# Patient Record
Sex: FEMALE | Race: WHITE | NOT HISPANIC OR LATINO | Employment: OTHER | URBAN - METROPOLITAN AREA
[De-identification: names, ages, dates, MRNs, and addresses within clinical notes are randomized per-mention and may not be internally consistent; named-entity substitution may affect disease eponyms.]

---

## 2017-11-14 ENCOUNTER — RECORDS - HEALTHEAST (OUTPATIENT)
Dept: LAB | Facility: CLINIC | Age: 67
End: 2017-11-14

## 2017-11-14 LAB
CHOLEST SERPL-MCNC: 150 MG/DL
FASTING STATUS PATIENT QL REPORTED: ABNORMAL
HDLC SERPL-MCNC: 40 MG/DL
LDLC SERPL CALC-MCNC: 75 MG/DL
TRIGL SERPL-MCNC: 176 MG/DL

## 2018-08-24 ENCOUNTER — RECORDS - HEALTHEAST (OUTPATIENT)
Dept: LAB | Facility: CLINIC | Age: 68
End: 2018-08-24

## 2018-08-24 LAB
ALBUMIN SERPL-MCNC: 3.8 G/DL (ref 3.5–5)
ALP SERPL-CCNC: 81 U/L (ref 45–120)
ALT SERPL W P-5'-P-CCNC: 26 U/L (ref 0–45)
ANION GAP SERPL CALCULATED.3IONS-SCNC: 10 MMOL/L (ref 5–18)
AST SERPL W P-5'-P-CCNC: 18 U/L (ref 0–40)
BILIRUB SERPL-MCNC: 0.6 MG/DL (ref 0–1)
BUN SERPL-MCNC: 10 MG/DL (ref 8–22)
CALCIUM SERPL-MCNC: 9.5 MG/DL (ref 8.5–10.5)
CHLORIDE BLD-SCNC: 104 MMOL/L (ref 98–107)
CHOLEST SERPL-MCNC: 149 MG/DL
CO2 SERPL-SCNC: 25 MMOL/L (ref 22–31)
CREAT SERPL-MCNC: 0.8 MG/DL (ref 0.6–1.1)
FASTING STATUS PATIENT QL REPORTED: ABNORMAL
GFR SERPL CREATININE-BSD FRML MDRD: >60 ML/MIN/1.73M2
GLUCOSE BLD-MCNC: 205 MG/DL (ref 70–125)
HDLC SERPL-MCNC: 39 MG/DL
LDLC SERPL CALC-MCNC: 85 MG/DL
POTASSIUM BLD-SCNC: 3.9 MMOL/L (ref 3.5–5)
PROT SERPL-MCNC: 6.3 G/DL (ref 6–8)
SODIUM SERPL-SCNC: 139 MMOL/L (ref 136–145)
TRIGL SERPL-MCNC: 123 MG/DL
TSH SERPL DL<=0.005 MIU/L-ACNC: 1.88 UIU/ML (ref 0.3–5)

## 2018-08-25 LAB — BACTERIA SPEC CULT: NORMAL

## 2019-05-10 ENCOUNTER — RECORDS - HEALTHEAST (OUTPATIENT)
Dept: LAB | Facility: CLINIC | Age: 69
End: 2019-05-10

## 2019-05-10 LAB
ANION GAP SERPL CALCULATED.3IONS-SCNC: 10 MMOL/L (ref 5–18)
BUN SERPL-MCNC: 14 MG/DL (ref 8–22)
CALCIUM SERPL-MCNC: 9.8 MG/DL (ref 8.5–10.5)
CHLORIDE BLD-SCNC: 106 MMOL/L (ref 98–107)
CHOLEST SERPL-MCNC: 138 MG/DL
CO2 SERPL-SCNC: 23 MMOL/L (ref 22–31)
CREAT SERPL-MCNC: 0.79 MG/DL (ref 0.6–1.1)
FASTING STATUS PATIENT QL REPORTED: ABNORMAL
GFR SERPL CREATININE-BSD FRML MDRD: >60 ML/MIN/1.73M2
GLUCOSE BLD-MCNC: 135 MG/DL (ref 70–125)
HDLC SERPL-MCNC: 37 MG/DL
LDLC SERPL CALC-MCNC: 56 MG/DL
POTASSIUM BLD-SCNC: 3.7 MMOL/L (ref 3.5–5)
SODIUM SERPL-SCNC: 139 MMOL/L (ref 136–145)
TRIGL SERPL-MCNC: 226 MG/DL

## 2019-12-06 ENCOUNTER — RECORDS - HEALTHEAST (OUTPATIENT)
Dept: LAB | Facility: CLINIC | Age: 69
End: 2019-12-06

## 2019-12-06 LAB
ALBUMIN SERPL-MCNC: 3.8 G/DL (ref 3.5–5)
ALP SERPL-CCNC: 73 U/L (ref 45–120)
ALT SERPL W P-5'-P-CCNC: 38 U/L (ref 0–45)
AMYLASE SERPL-CCNC: 56 U/L (ref 5–120)
AST SERPL W P-5'-P-CCNC: 23 U/L (ref 0–40)
BILIRUB DIRECT SERPL-MCNC: 0.3 MG/DL
BILIRUB SERPL-MCNC: 0.7 MG/DL (ref 0–1)
LIPASE SERPL-CCNC: 49 U/L (ref 0–52)
PROT SERPL-MCNC: 6.6 G/DL (ref 6–8)

## 2020-01-21 ENCOUNTER — RECORDS - HEALTHEAST (OUTPATIENT)
Dept: LAB | Facility: CLINIC | Age: 70
End: 2020-01-21

## 2020-01-23 LAB — H PYLORI AG STL QL IA: NEGATIVE

## 2020-06-19 ENCOUNTER — RECORDS - HEALTHEAST (OUTPATIENT)
Dept: LAB | Facility: CLINIC | Age: 70
End: 2020-06-19

## 2020-06-19 LAB
ALBUMIN SERPL-MCNC: 3.7 G/DL (ref 3.5–5)
ALP SERPL-CCNC: 120 U/L (ref 45–120)
ALT SERPL W P-5'-P-CCNC: 45 U/L (ref 0–45)
ANION GAP SERPL CALCULATED.3IONS-SCNC: 10 MMOL/L (ref 5–18)
AST SERPL W P-5'-P-CCNC: 26 U/L (ref 0–40)
BILIRUB SERPL-MCNC: 0.4 MG/DL (ref 0–1)
BUN SERPL-MCNC: 9 MG/DL (ref 8–22)
CALCIUM SERPL-MCNC: 9.1 MG/DL (ref 8.5–10.5)
CHLORIDE BLD-SCNC: 104 MMOL/L (ref 98–107)
CHOLEST SERPL-MCNC: 129 MG/DL
CO2 SERPL-SCNC: 24 MMOL/L (ref 22–31)
CREAT SERPL-MCNC: 0.93 MG/DL (ref 0.6–1.1)
FASTING STATUS PATIENT QL REPORTED: ABNORMAL
GFR SERPL CREATININE-BSD FRML MDRD: 60 ML/MIN/1.73M2
GLUCOSE BLD-MCNC: 248 MG/DL (ref 70–125)
HDLC SERPL-MCNC: 41 MG/DL
LDLC SERPL CALC-MCNC: 54 MG/DL
POTASSIUM BLD-SCNC: 4.1 MMOL/L (ref 3.5–5)
PROT SERPL-MCNC: 6.3 G/DL (ref 6–8)
SODIUM SERPL-SCNC: 138 MMOL/L (ref 136–145)
TRIGL SERPL-MCNC: 170 MG/DL

## 2021-04-14 ENCOUNTER — RECORDS - HEALTHEAST (OUTPATIENT)
Dept: LAB | Facility: CLINIC | Age: 71
End: 2021-04-14

## 2021-04-14 LAB
ALBUMIN SERPL-MCNC: 3.7 G/DL (ref 3.5–5)
ALP SERPL-CCNC: 104 U/L (ref 45–120)
ALT SERPL W P-5'-P-CCNC: 24 U/L (ref 0–45)
ANION GAP SERPL CALCULATED.3IONS-SCNC: 13 MMOL/L (ref 5–18)
AST SERPL W P-5'-P-CCNC: 15 U/L (ref 0–40)
BILIRUB SERPL-MCNC: 0.9 MG/DL (ref 0–1)
BUN SERPL-MCNC: 16 MG/DL (ref 8–28)
CALCIUM SERPL-MCNC: 8.6 MG/DL (ref 8.5–10.5)
CHLORIDE BLD-SCNC: 101 MMOL/L (ref 98–107)
CHOLEST SERPL-MCNC: 144 MG/DL
CO2 SERPL-SCNC: 23 MMOL/L (ref 22–31)
CREAT SERPL-MCNC: 1.1 MG/DL (ref 0.6–1.1)
FASTING STATUS PATIENT QL REPORTED: ABNORMAL
GFR SERPL CREATININE-BSD FRML MDRD: 49 ML/MIN/1.73M2
GLUCOSE BLD-MCNC: 288 MG/DL (ref 70–125)
HDLC SERPL-MCNC: 35 MG/DL
LDLC SERPL CALC-MCNC: 81 MG/DL
POTASSIUM BLD-SCNC: 3.6 MMOL/L (ref 3.5–5)
PROT SERPL-MCNC: 6.7 G/DL (ref 6–8)
SODIUM SERPL-SCNC: 137 MMOL/L (ref 136–145)
TRIGL SERPL-MCNC: 142 MG/DL

## 2021-05-29 ENCOUNTER — RECORDS - HEALTHEAST (OUTPATIENT)
Dept: ADMINISTRATIVE | Facility: CLINIC | Age: 71
End: 2021-05-29

## 2022-02-28 ENCOUNTER — LAB REQUISITION (OUTPATIENT)
Dept: LAB | Facility: CLINIC | Age: 72
End: 2022-02-28
Payer: COMMERCIAL

## 2022-02-28 DIAGNOSIS — R10.13 EPIGASTRIC PAIN: ICD-10-CM

## 2022-02-28 LAB
ALBUMIN SERPL-MCNC: 3.8 G/DL (ref 3.5–5)
ALP SERPL-CCNC: 88 U/L (ref 45–120)
ALT SERPL W P-5'-P-CCNC: 35 U/L (ref 0–45)
ANION GAP SERPL CALCULATED.3IONS-SCNC: 17 MMOL/L (ref 5–18)
AST SERPL W P-5'-P-CCNC: 33 U/L (ref 0–40)
BILIRUB SERPL-MCNC: 0.6 MG/DL (ref 0–1)
BUN SERPL-MCNC: 12 MG/DL (ref 8–28)
CALCIUM SERPL-MCNC: 9.7 MG/DL (ref 8.5–10.5)
CHLORIDE BLD-SCNC: 103 MMOL/L (ref 98–107)
CO2 SERPL-SCNC: 20 MMOL/L (ref 22–31)
CREAT SERPL-MCNC: 0.85 MG/DL (ref 0.6–1.1)
ERYTHROCYTE [DISTWIDTH] IN BLOOD BY AUTOMATED COUNT: 12.2 % (ref 10–15)
GFR SERPL CREATININE-BSD FRML MDRD: 73 ML/MIN/1.73M2
GLUCOSE BLD-MCNC: 198 MG/DL (ref 70–125)
HCT VFR BLD AUTO: 44.9 % (ref 35–47)
HGB BLD-MCNC: 15.2 G/DL (ref 11.7–15.7)
LIPASE SERPL-CCNC: 63 U/L (ref 0–52)
MCH RBC QN AUTO: 30.5 PG (ref 26.5–33)
MCHC RBC AUTO-ENTMCNC: 33.9 G/DL (ref 31.5–36.5)
MCV RBC AUTO: 90 FL (ref 78–100)
PLATELET # BLD AUTO: 275 10E3/UL (ref 150–450)
POTASSIUM BLD-SCNC: 4.2 MMOL/L (ref 3.5–5)
PROT SERPL-MCNC: 6.7 G/DL (ref 6–8)
RBC # BLD AUTO: 4.98 10E6/UL (ref 3.8–5.2)
SODIUM SERPL-SCNC: 140 MMOL/L (ref 136–145)
WBC # BLD AUTO: 7 10E3/UL (ref 4–11)

## 2022-02-28 PROCEDURE — 85027 COMPLETE CBC AUTOMATED: CPT | Mod: ORL | Performed by: FAMILY MEDICINE

## 2022-02-28 PROCEDURE — 83690 ASSAY OF LIPASE: CPT | Mod: ORL | Performed by: FAMILY MEDICINE

## 2022-02-28 PROCEDURE — 80053 COMPREHEN METABOLIC PANEL: CPT | Mod: ORL | Performed by: FAMILY MEDICINE

## 2022-04-26 ENCOUNTER — LAB REQUISITION (OUTPATIENT)
Dept: LAB | Facility: CLINIC | Age: 72
End: 2022-04-26
Payer: COMMERCIAL

## 2022-04-26 DIAGNOSIS — E78.2 MIXED HYPERLIPIDEMIA: ICD-10-CM

## 2022-04-26 LAB
CHOLEST SERPL-MCNC: 149 MG/DL
HDLC SERPL-MCNC: 45 MG/DL
LDLC SERPL CALC-MCNC: 74 MG/DL
TRIGL SERPL-MCNC: 149 MG/DL

## 2022-04-26 PROCEDURE — 80061 LIPID PANEL: CPT | Mod: ORL | Performed by: NURSE PRACTITIONER

## 2023-06-01 ENCOUNTER — TRANSFERRED RECORDS (OUTPATIENT)
Dept: HEALTH INFORMATION MANAGEMENT | Facility: CLINIC | Age: 73
End: 2023-06-01
Payer: COMMERCIAL

## 2023-06-01 ENCOUNTER — LAB REQUISITION (OUTPATIENT)
Dept: LAB | Facility: CLINIC | Age: 73
End: 2023-06-01

## 2023-06-01 DIAGNOSIS — E11.65 TYPE 2 DIABETES MELLITUS WITH HYPERGLYCEMIA (H): ICD-10-CM

## 2023-06-01 LAB
ANION GAP SERPL CALCULATED.3IONS-SCNC: 15 MMOL/L (ref 7–15)
BUN SERPL-MCNC: 12.1 MG/DL (ref 8–23)
CALCIUM SERPL-MCNC: 9.7 MG/DL (ref 8.8–10.2)
CHLORIDE SERPL-SCNC: 100 MMOL/L (ref 98–107)
CREAT SERPL-MCNC: 0.85 MG/DL (ref 0.51–0.95)
DEPRECATED HCO3 PLAS-SCNC: 24 MMOL/L (ref 22–29)
GFR SERPL CREATININE-BSD FRML MDRD: 72 ML/MIN/1.73M2
GLUCOSE SERPL-MCNC: 268 MG/DL (ref 70–99)
HBA1C MFR BLD: 8.6 % (ref 4.2–6.1)
HBA1C MFR BLD: 8.6 % (ref 4.2–6.1)
POTASSIUM SERPL-SCNC: 3.8 MMOL/L (ref 3.4–5.3)
SODIUM SERPL-SCNC: 139 MMOL/L (ref 136–145)

## 2023-06-01 PROCEDURE — 80048 BASIC METABOLIC PNL TOTAL CA: CPT | Performed by: NURSE PRACTITIONER

## 2023-07-10 ENCOUNTER — OFFICE VISIT (OUTPATIENT)
Dept: PHARMACY | Facility: PHYSICIAN GROUP | Age: 73
End: 2023-07-10
Payer: COMMERCIAL

## 2023-07-10 VITALS — HEART RATE: 71 BPM | DIASTOLIC BLOOD PRESSURE: 87 MMHG | SYSTOLIC BLOOD PRESSURE: 148 MMHG

## 2023-07-10 DIAGNOSIS — E11.9 TYPE 2 DIABETES MELLITUS WITHOUT COMPLICATION, WITHOUT LONG-TERM CURRENT USE OF INSULIN (H): Primary | ICD-10-CM

## 2023-07-10 DIAGNOSIS — I10 BENIGN ESSENTIAL HYPERTENSION: ICD-10-CM

## 2023-07-10 DIAGNOSIS — E78.5 HYPERLIPIDEMIA LDL GOAL <100: ICD-10-CM

## 2023-07-10 DIAGNOSIS — K21.00 GASTROESOPHAGEAL REFLUX DISEASE WITH ESOPHAGITIS, UNSPECIFIED WHETHER HEMORRHAGE: ICD-10-CM

## 2023-07-10 PROCEDURE — 99605 MTMS BY PHARM NP 15 MIN: CPT | Performed by: PHARMACIST

## 2023-07-10 PROCEDURE — 99607 MTMS BY PHARM ADDL 15 MIN: CPT | Performed by: PHARMACIST

## 2023-07-10 RX ORDER — AMLODIPINE BESYLATE 10 MG/1
10 TABLET ORAL DAILY
COMMUNITY
Start: 2022-11-23

## 2023-07-10 RX ORDER — PRAVASTATIN SODIUM 10 MG
10 TABLET ORAL DAILY
COMMUNITY
Start: 2022-10-14

## 2023-07-10 RX ORDER — FAMOTIDINE 20 MG/1
20 TABLET, FILM COATED ORAL 2 TIMES DAILY
COMMUNITY
Start: 2022-10-28

## 2023-07-10 RX ORDER — GLIPIZIDE 10 MG/1
20 TABLET, FILM COATED, EXTENDED RELEASE ORAL DAILY
COMMUNITY
Start: 2022-10-22

## 2023-07-10 RX ORDER — LISINOPRIL 40 MG/1
40 TABLET ORAL DAILY
COMMUNITY
Start: 2022-11-29

## 2023-07-10 RX ORDER — ASPIRIN 81 MG/1
81 TABLET ORAL DAILY
COMMUNITY

## 2023-07-10 NOTE — PROGRESS NOTES
Medication Therapy Management (MTM) Encounter    ASSESSMENT:                            Medication Adherence/Access: No issues identified    Type 2 Diabetes: Needs improvement, not meeting A1c goal <7%. Patient had not started Januvia and has been taking metformin inconsistently. Recommend better adherence to metformin and starting Januvia. Encouraged patient to check blood sugars at least once a week.      Hypertension: BP was above goal during visit today, however last several checks have been stable and below 140/90. Patient endorsed feeling nervous and anxious during the visit today due to discussion about medications. Plan to continue to monitor.     Hyperlipidemia: Stable.     GERD: Recommend limiting night time snacking including chips and dark chocolate. Reassess medication regimen after implementing lifestyle changes.     PLAN:                            1. Begin taking your Metformin two tablets twice daily everyday.  2. Start taking your Januvia 25 mg daily   3. Watch for symptoms of low blood sugars, such as dizziness, weakness, fatigue, sweatiness, shakiness. If you notice any of these symptoms, check your blood sugars and have something with sugar such as half a can of soda or juice.   4. Cut back on night time snacking, including with chips and dark chocolate.     Follow-up: Return in 24 days (on 8/3/2023) for Follow up, with me, using a phone visit.    SUBJECTIVE/OBJECTIVE:                          Sheree Butt is a 72 year old female coming in for an initial visit. She was referred to me from Monica Alcala.      Reason for visit: MTM.    Allergies/ADRs: None  Past Medical History: Reviewed in chart  Tobacco: She has no history on file for tobacco use. 37 years ago, not currently smoking   Alcohol: 1-3 beverages / week      Medication Adherence/Access: Uses pill box, keeps it on her nightstand. Forgets doses about once every two weeks. Usually more of the morning doses are forgotten.       Type 2  Diabetes:   Notes she has not started the Januvia at this time. Reports she was going to make big lifestyle changes which is way she held off on starting this. Notes she has not been as consistent with diet and exercise as she had hoped and is now ready to start taking the Januvia after our conversation today.   glipiZIDE ER 10 MG Tablet Extended Release 24 Hour, 2 tabs once a day  Januvia 25 MG Tablet,  1 tablet Orally Once a day  metFORMIN HCl 500 MG Tablet, 2 tab(s) orally twice a day for diabetes    Blood Sugar Ranges (patient reported): checking very infrequently. Patient does not like to prick her finger.   Symptoms of low blood sugar? none.   Symptoms of high blood sugar? none    Eye exam: up to date  Foot exam: up to date  Diet/Exercise: Recently got a yoga subscription for her to take at home classes. Notes she can work on her diet and cut back on night time snacking.     Aspirin: Taking 81mg daily and denies side effects   Statin: Yes  ACEi/ARB: Yes  No results found for: A1C  A1c (6/1/23): 8.6%    Hypertension:   Lisinopril 40 MG Tablet, TAKE 1 TABLET BY MOUTH ONCE DAY  amLODIPine Besylate 10 MG Tablet, Sig: TAKE 1 TABLET BY MOUTH ONCE DAILY      Patient does not self-monitor blood pressure.    Patient reports no current medication side effects.  Last OV BP (6/1/23): 130/82 mmHg    Hyperlipidemia:   Pravastatin 10 mg daily  Patient reports no significant myalgias or other side effects.  Recent Labs   Lab Test 04/26/22  0826 04/14/21  1050   CHOL 149 144   HDL 45* 35*   LDL 74 81   TRIG 149 142     GERD:   Prilosec (omeprazole) 20 mg and Pepcid (famotidine) 20 mg twice daily  Patient reports heartburn, symptoms occur at night, symptoms primarily relate to meals, and lying down after meals.    Taking tums at night, has to take tums every night recently. Goes to bed and within an hour has symptoms. Admits to snacking at night with chips and dark chocolate and believes this is the cause of her symptoms.    Patient feels that current regimen is not effective.    Today's Vitals: BP (!) 148/87   Pulse 71   ----------------      I spent 45 minutes with this patient today. I offer these suggestions for consideration by Porsche Alcala, and changes were made in collaboration with Porsche Alcala via [facility]. A copy of the visit note was provided to the patient's provider(s).    A summary of these recommendations was mailed to the patient.    Nemo Deal, PharmD  Medication Therapy Management Pharmacist     Medication Therapy Recommendations  Type 2 diabetes mellitus without complication, without long-term current use of insulin (H)    Current Medication: metFORMIN (GLUCOPHAGE) 500 MG tablet   Rationale: Does not understand instructions - Adherence - Adherence   Recommendation: Provide Adherence Intervention   Status: Patient Agreed - Adherence/Education

## 2023-07-10 NOTE — LETTER
_  Medication List        Prepared on: 07/10/2023     Bring your Medication List when you go to the doctor, hospital, or   emergency room. And, share it with your family or caregivers.     Note any changes to how you take your medications.  Cross out medications when you no longer use them.    Medication How I take it Why I use it Prescriber   amLODIPine (NORVASC) 10 MG tablet Take 10 mg by mouth daily  blood pressure Porsche Alcala    aspirin 81 MG EC tablet Take 81 mg by mouth daily Stroke prevention Porsche Alcala    famotidine (PEPCID) 20 MG tablet Take 20 mg by mouth 2 times daily Heartburn Porsche Alcala    glipiZIDE (GLUCOTROL XL) 10 MG 24 hr tablet Take 20 mg by mouth daily Diabetes  Porsche Alcala    lisinopril (ZESTRIL) 40 MG tablet Take 40 mg by mouth daily Blood Pressure Porsche Alcala    metFORMIN (GLUCOPHAGE) 500 MG tablet Take two tablets by mouth 2 times daily (with meals)  Diabetes Porsche Alcala    omeprazole (PRILOSEC) 20 MG DR capsule Take 20 mg by mouth daily  Heartburn Porsche Alcala    pravastatin (PRAVACHOL) 10 MG tablet Take 10 mg by mouth daily Cholesterol Porsche Alcala    sitagliptin (JANUVIA) 25 MG tablet Take 25 mg by mouth daily Diabetes Porsche Alcala          Add new medications, over-the-counter drugs, herbals, vitamins, or  minerals in the blank rows below.    Medication How I take it Why I use it Prescriber                                      Allergies:      No Known Allergies        Side effects I have had:               Other Information:              My notes and questions:

## 2023-07-10 NOTE — LETTER
July 10, 2023  Sheree Butt  [address]  [place] [address]    Dear Ms. Butt, [facility]     Thank you for talking with me on Jul 10, 2023 about your health and medications. As a follow-up to our conversation, I have included two documents:      Your Recommended To-Do List has steps you should take to get the best results from your medications.  Your Medication List will help you keep track of your medications and how to take them.    If you want to talk about these documents, please call Nemo Deal RPH at phone: [phone], Monday-Friday 8-4:30pm.    I look forward to working with you and your doctors to make sure your medications work well for you.    Sincerely,  Nemo Deal RPH  [facility] Pharmacist, [facility]

## 2023-07-10 NOTE — LETTER
"Recommended To-Do List      Prepared on: 07/10/2023       You can get the best results from your medications by completing the items on this \"To-Do List.\"      Bring your To-Do List when you go to your doctor. And, share it with your family or caregivers.    My To-Do List:  What we talked about: What I should do:   The importance of taking your medication as intended    Reminder to take your medication as prescribed for metFORMIN two tablets by mouth twice daily           What we talked about: What I should do:                     "

## 2023-08-02 NOTE — PROGRESS NOTES
"Medication Therapy Management (MTM) Encounter    ASSESSMENT:                            Medication Adherence/Access: No issues identified    Type 2 Diabetes: Needs improvement, not meeting A1c goal <7%. Patient has started Januvia and correct dose of metformin. Instructed patient how to use her meter to check her blood sugars today. Encouraged patient to check blood sugars at least daily and whenever she is feeling off.       PLAN:                            1. Watch for symptoms of low blood sugars, such as dizziness, weakness, fatigue, sweatiness, shakiness. If you notice any of these symptoms, check your blood sugars and have something with sugar such as half a can of soda or juice.   2. Cut back on night time snacking, including with chips and dark chocolate.   3. We will check your labs at your visit with Porsche on 8/10.     Follow-up: Return in about 3 months (around 11/3/2023) for Follow up.    SUBJECTIVE/OBJECTIVE:                          Sheree Butt is a 72 year old female called for a follow up visit. She was referred to me from Monica Alcala. Her last visit was with me on 7/10/23.     Reason for visit: MTM.    Allergies/ADRs: None  Past Medical History: Reviewed in chart  Tobacco: She has no history on file for tobacco use. 37 years ago, not currently smoking   Alcohol: 1-3 beverages / week      Medication Adherence/Access: Uses pill box, keeps it on her nightstand. Forgets doses about once every two weeks. Usually more of the morning doses are forgotten.       Type 2 Diabetes:   Patient reports she started the Januvia and correct dose of metformin after our last visit. On the call today she notes she has felt off since the med changes, denies headache, nausea, fever. Just states \"feeling off\". Patient noted she has not been checking her blood sugars when she feels this way as she has had trouble with her meter. We discussed how to use her bg monitor to check her sugars and I instructed her to " check whenever she feels off.    glipiZIDE ER 10 MG Tablet Extended Release 24 Hour, 2 tabs once a day  Januvia 25 MG Tablet,  1 tablet Orally Once a day  metFORMIN HCl 500 MG Tablet, 2 tab(s) orally twice a day for diabetes    Blood Sugar Ranges (patient reported): checking very infrequently. Patient does not like to prick her finger.   Symptoms of low blood sugar? none.   Symptoms of high blood sugar? none    Eye exam: up to date  Foot exam: up to date  Diet/Exercise: Recently got a yoga subscription for her to take at home classes. Notes she can work on her diet and cut back on night time snacking.     Aspirin: Taking 81mg daily and denies side effects   Statin: Yes  ACEi/ARB: Yes  No results found for: A1C  A1c (6/1/23): 8.6%      Today's Vitals: There were no vitals taken for this visit.  ----------------      I spent 12 minutes with this patient today. I offer these suggestions for consideration by Porsche Alcala, and changes were made in collaboration with Porsche Alcala via [facility]. A copy of the visit note was provided to the patient's provider(s).    A summary of these recommendations was declined by the patient.      Nemo Deal, PharmD  Medication Therapy Management Pharmacist    Telemedicine Visit Details  Type of service:  Telephone visit  Start Time:  10:08 am  End Time:  10:20 am     Medication Therapy Recommendations  Type 2 diabetes mellitus without complication, without long-term current use of insulin (H)    Current Medication: sitagliptin (JANUVIA) 25 MG tablet   Rationale: Medication requires monitoring - Needs additional monitoring   Recommendation: Self-Monitoring   Status: Patient Agreed - Adherence/Education   Note: start checking blood sugars

## 2023-08-03 ENCOUNTER — VIRTUAL VISIT (OUTPATIENT)
Dept: PHARMACY | Facility: PHYSICIAN GROUP | Age: 73
End: 2023-08-03
Payer: COMMERCIAL

## 2023-08-03 DIAGNOSIS — E11.9 TYPE 2 DIABETES MELLITUS WITHOUT COMPLICATION, WITHOUT LONG-TERM CURRENT USE OF INSULIN (H): Primary | ICD-10-CM

## 2023-08-03 PROCEDURE — 99607 MTMS BY PHARM ADDL 15 MIN: CPT | Performed by: PHARMACIST

## 2023-08-03 PROCEDURE — 99606 MTMS BY PHARM EST 15 MIN: CPT | Performed by: PHARMACIST

## 2023-08-03 NOTE — PATIENT INSTRUCTIONS
"Recommendations from today's MTM visit:                                                    MTM (medication therapy management) is a service provided by a clinical pharmacist designed to help you get the most of out of your medicines.      1. Watch for symptoms of low blood sugars, such as dizziness, weakness, fatigue, sweatiness, shakiness. If you notice any of these symptoms, check your blood sugars and have something with sugar such as half a can of soda or juice.   2. Cut back on night time snacking, including with chips and dark chocolate.   3. We will check your labs at your visit with Porsche on 8/10.     Follow-up: Return in about 3 months (around 11/3/2023) for Follow up.    It was great speaking with you today.  I value your experience and would be very thankful for your time in providing feedback in our clinic survey. In the next few days, you may receive an email or text message from [employer] with a link to a survey related to your  clinical pharmacist.\"     To schedule another MTM appointment, please call the clinic directly or you may call the MTM scheduling line at [phone] or toll-free at [phone].     My Clinical Pharmacist's contact information:                                                      Please feel free to contact me with any questions or concerns you have.      Nemo Deal, PharmD  Medication Therapy Management Pharmacist     "

## 2023-08-04 ENCOUNTER — TRANSFERRED RECORDS (OUTPATIENT)
Dept: HEALTH INFORMATION MANAGEMENT | Facility: CLINIC | Age: 73
End: 2023-08-04

## 2023-08-10 ENCOUNTER — LAB REQUISITION (OUTPATIENT)
Dept: LAB | Facility: CLINIC | Age: 73
End: 2023-08-10

## 2023-08-10 DIAGNOSIS — E78.2 MIXED HYPERLIPIDEMIA: ICD-10-CM

## 2023-08-10 LAB
CHOLEST SERPL-MCNC: 138 MG/DL
HDLC SERPL-MCNC: 43 MG/DL
LDLC SERPL CALC-MCNC: 72 MG/DL
NONHDLC SERPL-MCNC: 95 MG/DL
TRIGL SERPL-MCNC: 117 MG/DL

## 2023-08-10 PROCEDURE — 80061 LIPID PANEL: CPT | Performed by: NURSE PRACTITIONER

## 2023-10-25 ENCOUNTER — TRANSFERRED RECORDS (OUTPATIENT)
Dept: HEALTH INFORMATION MANAGEMENT | Facility: CLINIC | Age: 73
End: 2023-10-25

## 2023-10-25 LAB — HBA1C MFR BLD: 8.1 % (ref 4.2–6.1)

## 2024-01-09 ENCOUNTER — MEDICAL CORRESPONDENCE (OUTPATIENT)
Dept: HEALTH INFORMATION MANAGEMENT | Facility: CLINIC | Age: 74
End: 2024-01-09
Payer: COMMERCIAL

## 2024-01-10 ENCOUNTER — TRANSCRIBE ORDERS (OUTPATIENT)
Dept: OTHER | Age: 74
End: 2024-01-10

## 2024-01-10 DIAGNOSIS — N64.4 BREAST PAIN: Primary | ICD-10-CM

## 2024-01-19 NOTE — PROGRESS NOTES
History:  This is a 73 year old female who I'm asked to see by Dr. Alcala for evaluation of a right breast pain.  The pain has been going on for about 5 weeks.  It starts at the nipple and radiates out laterally.  She describes it as constant and sharp and shooting.  It is worse with movement, palpation, wearing a bra, and laying on her right side.  This is never happened to her before.  She has not tried any medications or treatments to make it better.  She believes that she started taking Januvia right before it all started.  She denies having any new breast symptoms such as palpable masses, skin changes, rash, or nipple discharge.    Past medical history:  DM  HTN  Dyslipidemia   GERD    Past surgical history:  Denies    Medications:    amLODIPine (NORVASC) 10 MG tablet, Take 10 mg by mouth daily, Disp: , Rfl:     aspirin 81 MG EC tablet, Take 81 mg by mouth daily, Disp: , Rfl:     famotidine (PEPCID) 20 MG tablet, Take 20 mg by mouth 2 times daily, Disp: , Rfl:     glipiZIDE (GLUCOTROL XL) 10 MG 24 hr tablet, Take 20 mg by mouth daily, Disp: , Rfl:     lisinopril (ZESTRIL) 40 MG tablet, Take 40 mg by mouth daily, Disp: , Rfl:     metFORMIN (GLUCOPHAGE) 500 MG tablet, Take 1,000 mg by mouth 2 times daily (with meals), Disp: , Rfl:     omeprazole (PRILOSEC) 20 MG DR capsule, Take 20 mg by mouth daily, Disp: , Rfl:     pravastatin (PRAVACHOL) 10 MG tablet, Take 10 mg by mouth daily, Disp: , Rfl:     sitagliptin (JANUVIA) 25 MG tablet, Take 25 mg by mouth daily, Disp: , Rfl:     Allergies:  No Known Allergies    Social history:  Reports that she has never smoked. She has never been exposed to tobacco smoke. She has never used smokeless tobacco.  Denies alcohol, marijuana, and illicit drug use.    Family history:  She has no family history of breast cancer.    Review of systems:  General: No complaints or constitutional symptoms  Skin: No complaints or symptoms   Hematologic/Lymphatic: No symptoms or  "complaints  Psychiatric: No symptoms or complaints  Endocrine: No excessive fatigue, no hypermetabolic symptoms reported  Respiratory: No cough, shortness of breath, or wheezing  Cardiovascular: No chest pain or dyspnea on exertion  Breast: Right breast pain  Gastrointestinal: No abdominal pain, nausea, diarrhea, or constipation  Musculoskeletal: No recent injuries reported  Neurological: No focal neurologic defects reported.      Exam:  Ht 1.746 m (5' 8.75\")   Wt 92.5 kg (204 lb)   BMI 30.35 kg/m    General: Alert, cooperative, appears stated age   Skin: Skin color, texture, turgor normal, no rashes or lesions   Lymphatic: No obvious adenopathy, no swelling   Eyes: No scleral icterus, pupils equal  HENT: No traumatic injury to the head or face, no gross abnormalities  Lungs: Normal respiratory effort, breath sounds equal bilaterally  Heart: Regular rate and rhythm  Breasts: No visible or palpable abnormality bilaterally.  With extremely light and more firm palpation to the right nipple areolar complex and radiating out at the 9:00 distribution the patient is exquisitely tender.  She is not tender to palpation in this distribution onto her back.  Abdomen: Soft, non-distended and non-tender to palpation  Neurologic: Grossly intact    Imaging:  Pertinent images personally reviewed by myself and discussed with the patient.  Radiology reports:  [facility] MRN: [id number]  Exam Date: 12/21/2023  EXAM: DIAGNOSTIC BILATERAL DIGITAL TOMOSYNTHESIS MAMMOGRAPHY WITH CAD AND FOCUSED RIGHT BREAST ULTRASOUND EXAM.  CLINICAL INFORMATION: Right breast pain  COMPARISON: None    TECHNIQUE:  - Diagnostic Mammography: Bilateral CC and MLO tomographic images. CAD was applied.  - Breast Ultrasound was performed using high-resolution ultrasound transducer. Study was focused towards the area of clinical/ mammographic abnormality only.    Mammogram: Breast parenchyma is fat replaced. There is no mass or suspicious areas " microcalcification.    Ultrasound: Images were obtained from 8-10 o'clock, near the chest wall. There is no sonographic abnormality.    CONCLUSION:  1.Negative  ACR BI-RADS Category 1-negative  RECOMMENDATION: Recommend return to routine annual mammographic screening in one year    Pathology:  None    IMPRESSION:  Right breast pain.      PLAN:   Reviewed her imaging and discussed the results.  Both the mammogram and US were normal.  There is no indication for surgical management or further work-up without specific breast pathology.  Discussed the pathophysiology of mastalgia, such as stretching of Gavino's ligaments due to large pendulous breasts, hormone replacement therapy, mastitis, cancer, extramammary pain, etc.  Certain medications have been known to cause breast pain such as estrogen, progesterone, antidepressants (SSRIs), antipsychotics, anabolic steroids, and some diuretics.  She does not have any suspicious physical findings, such as a palpable mass or nipple discharge.  First-line management includes good breast support, OTC analgesics, reduction in caffeine, and warm or cold compresses.  Reassurance was provided.  Some women have decent relief with evening primrose oil.  She will try these conservative measures and return to the breast clinic as needed.  As a backup, a prescription for gabapentin was sent to her pharmacy.  She will start with 300 mg at night.  This can be titrated up to 300 mg 3 times daily, or even more if she has decent relief without side effects.  If after 6 months the symptoms continue, then more aggressive measures could be taken such as trying Effexor or tamoxifen.      Winifred Martinez DO  General Surgeon  [facility]  Breast [address] [address]  Office: [phone]  Employed by - [facility]

## 2024-01-22 ENCOUNTER — OFFICE VISIT (OUTPATIENT)
Dept: SURGERY | Facility: CLINIC | Age: 74
End: 2024-01-22
Attending: NURSE PRACTITIONER
Payer: COMMERCIAL

## 2024-01-22 VITALS — BODY MASS INDEX: 30.21 KG/M2 | HEIGHT: 69 IN | WEIGHT: 204 LBS

## 2024-01-22 DIAGNOSIS — N64.4 BREAST PAIN, RIGHT: Primary | ICD-10-CM

## 2024-01-22 PROCEDURE — 99204 OFFICE O/P NEW MOD 45 MIN: CPT | Performed by: SURGERY

## 2024-01-22 PROCEDURE — G0463 HOSPITAL OUTPT CLINIC VISIT: HCPCS | Performed by: SURGERY

## 2024-01-22 RX ORDER — GABAPENTIN 300 MG/1
300 CAPSULE ORAL 3 TIMES DAILY PRN
Qty: 90 CAPSULE | Refills: 3 | Status: SHIPPED | OUTPATIENT
Start: 2024-01-22

## 2024-01-22 NOTE — NURSING NOTE
Sheree presents to [facility] for a surgical consult with Dr. Martinez  regarding right breast pain.  Patient has had imaging, see report for details.  RN assessment and EMR update.  Patient met with Dr. Martinez .  See dictation for details of visit and follow up plan.  Support provided, invited calls.

## 2024-04-17 ENCOUNTER — LAB REQUISITION (OUTPATIENT)
Dept: LAB | Facility: CLINIC | Age: 74
End: 2024-04-17

## 2024-04-17 DIAGNOSIS — I10 ESSENTIAL (PRIMARY) HYPERTENSION: ICD-10-CM

## 2024-04-17 PROCEDURE — 84450 TRANSFERASE (AST) (SGOT): CPT | Performed by: NURSE PRACTITIONER

## 2024-04-18 LAB
ALBUMIN SERPL BCG-MCNC: 4.1 G/DL (ref 3.5–5.2)
ALP SERPL-CCNC: 64 U/L (ref 40–150)
ALT SERPL W P-5'-P-CCNC: 10 U/L (ref 0–50)
ANION GAP SERPL CALCULATED.3IONS-SCNC: 14 MMOL/L (ref 7–15)
AST SERPL W P-5'-P-CCNC: 14 U/L (ref 0–45)
BILIRUB SERPL-MCNC: 0.3 MG/DL
BUN SERPL-MCNC: 13.4 MG/DL (ref 8–23)
CALCIUM SERPL-MCNC: 9.1 MG/DL (ref 8.8–10.2)
CHLORIDE SERPL-SCNC: 102 MMOL/L (ref 98–107)
CREAT SERPL-MCNC: 0.98 MG/DL (ref 0.51–0.95)
DEPRECATED HCO3 PLAS-SCNC: 25 MMOL/L (ref 22–29)
EGFRCR SERPLBLD CKD-EPI 2021: 61 ML/MIN/1.73M2
GLUCOSE SERPL-MCNC: 132 MG/DL (ref 70–99)
POTASSIUM SERPL-SCNC: 4.2 MMOL/L (ref 3.4–5.3)
PROT SERPL-MCNC: 6.7 G/DL (ref 6.4–8.3)
SODIUM SERPL-SCNC: 141 MMOL/L (ref 135–145)

## 2024-11-01 ENCOUNTER — LAB REQUISITION (OUTPATIENT)
Dept: LAB | Facility: CLINIC | Age: 74
End: 2024-11-01

## 2024-11-01 DIAGNOSIS — I10 ESSENTIAL (PRIMARY) HYPERTENSION: ICD-10-CM

## 2024-11-01 DIAGNOSIS — E78.49 OTHER HYPERLIPIDEMIA: ICD-10-CM

## 2024-11-01 PROCEDURE — 80061 LIPID PANEL: CPT | Performed by: NURSE PRACTITIONER

## 2024-11-01 PROCEDURE — 80048 BASIC METABOLIC PNL TOTAL CA: CPT | Performed by: NURSE PRACTITIONER

## 2024-11-01 PROCEDURE — 82947 ASSAY GLUCOSE BLOOD QUANT: CPT | Performed by: NURSE PRACTITIONER

## 2024-11-02 LAB
ANION GAP SERPL CALCULATED.3IONS-SCNC: 12 MMOL/L (ref 7–15)
BUN SERPL-MCNC: 12.3 MG/DL (ref 8–23)
CALCIUM SERPL-MCNC: 8.8 MG/DL (ref 8.8–10.4)
CHLORIDE SERPL-SCNC: 103 MMOL/L (ref 98–107)
CHOLEST SERPL-MCNC: 145 MG/DL
CREAT SERPL-MCNC: 0.8 MG/DL (ref 0.51–0.95)
EGFRCR SERPLBLD CKD-EPI 2021: 77 ML/MIN/1.73M2
FASTING STATUS PATIENT QL REPORTED: ABNORMAL
FASTING STATUS PATIENT QL REPORTED: ABNORMAL
GLUCOSE SERPL-MCNC: 288 MG/DL (ref 70–99)
HCO3 SERPL-SCNC: 23 MMOL/L (ref 22–29)
HDLC SERPL-MCNC: 40 MG/DL
LDLC SERPL CALC-MCNC: 76 MG/DL
NONHDLC SERPL-MCNC: 105 MG/DL
POTASSIUM SERPL-SCNC: 4.2 MMOL/L (ref 3.4–5.3)
SODIUM SERPL-SCNC: 138 MMOL/L (ref 135–145)
TRIGL SERPL-MCNC: 147 MG/DL

## 2025-07-21 ENCOUNTER — HOSPITAL ENCOUNTER (EMERGENCY)
Facility: HOSPITAL | Age: 75
Discharge: HOME | End: 2025-07-21
Attending: EMERGENCY MEDICINE
Payer: COMMERCIAL

## 2025-07-21 ENCOUNTER — APPOINTMENT (OUTPATIENT)
Dept: CT IMAGING | Facility: HOSPITAL | Age: 75
End: 2025-07-21
Attending: EMERGENCY MEDICINE
Payer: COMMERCIAL

## 2025-07-21 VITALS
WEIGHT: 203 LBS | BODY MASS INDEX: 30.2 KG/M2 | RESPIRATION RATE: 22 BRPM | DIASTOLIC BLOOD PRESSURE: 68 MMHG | HEART RATE: 72 BPM | OXYGEN SATURATION: 94 % | TEMPERATURE: 97.9 F | SYSTOLIC BLOOD PRESSURE: 146 MMHG

## 2025-07-21 DIAGNOSIS — R07.89 CHEST WALL PAIN: ICD-10-CM

## 2025-07-21 DIAGNOSIS — R91.1 PULMONARY NODULE: ICD-10-CM

## 2025-07-21 DIAGNOSIS — M47.812 CERVICAL SPONDYLOSIS: ICD-10-CM

## 2025-07-21 LAB
ALBUMIN SERPL BCG-MCNC: 4.2 G/DL (ref 3.5–5.2)
ALP SERPL-CCNC: 64 U/L (ref 40–150)
ALT SERPL W P-5'-P-CCNC: 20 U/L (ref 0–50)
ANION GAP SERPL CALCULATED.3IONS-SCNC: 12 MMOL/L (ref 7–15)
AST SERPL W P-5'-P-CCNC: 16 U/L (ref 0–45)
ATRIAL RATE - MUSE: 75 BPM
BASOPHILS # BLD AUTO: 0.1 10E3/UL (ref 0–0.2)
BASOPHILS NFR BLD AUTO: 1 %
BILIRUB SERPL-MCNC: 0.4 MG/DL
BUN SERPL-MCNC: 11.3 MG/DL (ref 8–23)
CALCIUM SERPL-MCNC: 9.4 MG/DL (ref 8.8–10.4)
CHLORIDE SERPL-SCNC: 102 MMOL/L (ref 98–107)
CREAT SERPL-MCNC: 0.88 MG/DL (ref 0.51–0.95)
CRP SERPL-MCNC: <3 MG/L
DIASTOLIC BLOOD PRESSURE - MUSE: NORMAL MMHG
EGFRCR SERPLBLD CKD-EPI 2021: 69 ML/MIN/1.73M2
EOSINOPHIL # BLD AUTO: 0.3 10E3/UL (ref 0–0.7)
EOSINOPHIL NFR BLD AUTO: 4 %
ERYTHROCYTE [DISTWIDTH] IN BLOOD BY AUTOMATED COUNT: 13.2 % (ref 10–15)
ERYTHROCYTE [SEDIMENTATION RATE] IN BLOOD BY WESTERGREN METHOD: 11 MM/HR (ref 0–30)
GLUCOSE SERPL-MCNC: 141 MG/DL (ref 70–99)
HCO3 SERPL-SCNC: 26 MMOL/L (ref 22–29)
HCT VFR BLD AUTO: 42.7 % (ref 35–47)
HGB BLD-MCNC: 14.9 G/DL (ref 11.7–15.7)
HOLD SPECIMEN: NORMAL
IMM GRANULOCYTES # BLD: 0 10E3/UL
IMM GRANULOCYTES NFR BLD: 1 %
INTERPRETATION ECG - MUSE: NORMAL
LYMPHOCYTES # BLD AUTO: 1.5 10E3/UL (ref 0.8–5.3)
LYMPHOCYTES NFR BLD AUTO: 22 %
MCH RBC QN AUTO: 30 PG (ref 26.5–33)
MCHC RBC AUTO-ENTMCNC: 34.9 G/DL (ref 31.5–36.5)
MCV RBC AUTO: 86 FL (ref 78–100)
MONOCYTES # BLD AUTO: 0.6 10E3/UL (ref 0–1.3)
MONOCYTES NFR BLD AUTO: 8 %
NEUTROPHILS # BLD AUTO: 4.4 10E3/UL (ref 1.6–8.3)
NEUTROPHILS NFR BLD AUTO: 65 %
NRBC # BLD AUTO: 0 10E3/UL
NRBC BLD AUTO-RTO: 0 /100
P AXIS - MUSE: 69 DEGREES
PLATELET # BLD AUTO: 240 10E3/UL (ref 150–450)
POTASSIUM SERPL-SCNC: 3.7 MMOL/L (ref 3.4–5.3)
PR INTERVAL - MUSE: 176 MS
PROT SERPL-MCNC: 6.9 G/DL (ref 6.4–8.3)
QRS DURATION - MUSE: 92 MS
QT - MUSE: 394 MS
QTC - MUSE: 439 MS
R AXIS - MUSE: -49 DEGREES
RBC # BLD AUTO: 4.97 10E6/UL (ref 3.8–5.2)
SODIUM SERPL-SCNC: 140 MMOL/L (ref 135–145)
SYSTOLIC BLOOD PRESSURE - MUSE: NORMAL MMHG
T AXIS - MUSE: 75 DEGREES
TROPONIN T SERPL HS-MCNC: 11 NG/L
TROPONIN T SERPL HS-MCNC: 9 NG/L
VENTRICULAR RATE- MUSE: 75 BPM
WBC # BLD AUTO: 6.8 10E3/UL (ref 4–11)

## 2025-07-21 PROCEDURE — 36415 COLL VENOUS BLD VENIPUNCTURE: CPT | Performed by: EMERGENCY MEDICINE

## 2025-07-21 PROCEDURE — 250N000011 HC RX IP 250 OP 636: Performed by: EMERGENCY MEDICINE

## 2025-07-21 PROCEDURE — 86140 C-REACTIVE PROTEIN: CPT | Performed by: EMERGENCY MEDICINE

## 2025-07-21 PROCEDURE — 93005 ELECTROCARDIOGRAM TRACING: CPT | Performed by: EMERGENCY MEDICINE

## 2025-07-21 PROCEDURE — 84484 ASSAY OF TROPONIN QUANT: CPT | Performed by: EMERGENCY MEDICINE

## 2025-07-21 PROCEDURE — 82247 BILIRUBIN TOTAL: CPT | Performed by: EMERGENCY MEDICINE

## 2025-07-21 PROCEDURE — 85652 RBC SED RATE AUTOMATED: CPT | Performed by: EMERGENCY MEDICINE

## 2025-07-21 PROCEDURE — 36415 COLL VENOUS BLD VENIPUNCTURE: CPT | Performed by: STUDENT IN AN ORGANIZED HEALTH CARE EDUCATION/TRAINING PROGRAM

## 2025-07-21 PROCEDURE — 72125 CT NECK SPINE W/O DYE: CPT

## 2025-07-21 PROCEDURE — 250N000013 HC RX MED GY IP 250 OP 250 PS 637: Performed by: EMERGENCY MEDICINE

## 2025-07-21 PROCEDURE — 99285 EMERGENCY DEPT VISIT HI MDM: CPT | Mod: 25 | Performed by: EMERGENCY MEDICINE

## 2025-07-21 PROCEDURE — 71275 CT ANGIOGRAPHY CHEST: CPT

## 2025-07-21 PROCEDURE — 85018 HEMOGLOBIN: CPT | Performed by: EMERGENCY MEDICINE

## 2025-07-21 RX ORDER — BACLOFEN 10 MG/1
10 TABLET ORAL 3 TIMES DAILY PRN
Qty: 20 TABLET | Refills: 0 | Status: SHIPPED | OUTPATIENT
Start: 2025-07-21

## 2025-07-21 RX ORDER — IOPAMIDOL 755 MG/ML
90 INJECTION, SOLUTION INTRAVASCULAR ONCE
Status: COMPLETED | OUTPATIENT
Start: 2025-07-21 | End: 2025-07-21

## 2025-07-21 RX ORDER — LIDOCAINE 4 G/G
1 PATCH TOPICAL ONCE
Status: DISCONTINUED | OUTPATIENT
Start: 2025-07-21 | End: 2025-07-21 | Stop reason: HOSPADM

## 2025-07-21 RX ORDER — KETOROLAC TROMETHAMINE 10 MG/1
10 TABLET, FILM COATED ORAL EVERY 6 HOURS PRN
Qty: 20 TABLET | Refills: 0 | Status: SHIPPED | OUTPATIENT
Start: 2025-07-21

## 2025-07-21 RX ADMIN — LIDOCAINE 1 PATCH: 4 PATCH TOPICAL at 07:16

## 2025-07-21 RX ADMIN — IOPAMIDOL 90 ML: 755 INJECTION, SOLUTION INTRAVENOUS at 08:35

## 2025-07-21 ASSESSMENT — ACTIVITIES OF DAILY LIVING (ADL)
ADLS_ACUITY_SCORE: 41

## 2025-07-21 ASSESSMENT — COLUMBIA-SUICIDE SEVERITY RATING SCALE - C-SSRS
1. IN THE PAST MONTH, HAVE YOU WISHED YOU WERE DEAD OR WISHED YOU COULD GO TO SLEEP AND NOT WAKE UP?: NO
6. HAVE YOU EVER DONE ANYTHING, STARTED TO DO ANYTHING, OR PREPARED TO DO ANYTHING TO END YOUR LIFE?: NO
2. HAVE YOU ACTUALLY HAD ANY THOUGHTS OF KILLING YOURSELF IN THE PAST MONTH?: NO

## 2025-07-21 NOTE — ED TRIAGE NOTES
Pt states that she has had mid sternal tightness for a week.  Intensity varies, gets worse at night.  Pt denies sob or nausea.  Pt states having similar pain 10-15 years ago.      Triage Assessment (Adult)       Row Name 07/21/25 0602          Triage Assessment    Airway WDL WDL        Respiratory WDL    Respiratory WDL WDL        Skin Circulation/Temperature WDL    Skin Circulation/Temperature WDL WDL        Cardiac WDL    Cardiac WDL X;chest pain        Chest Pain Assessment    Chest Pain Location midsternal     Chest Pain Radiation shoulder     Character tightness     Precipitating Factors at rest        Peripheral/Neurovascular WDL    Peripheral Neurovascular WDL WDL        Cognitive/Neuro/Behavioral WDL    Cognitive/Neuro/Behavioral WDL WDL

## 2025-07-21 NOTE — ED PROVIDER NOTES
EMERGENCY DEPARTMENT ENCOUNTER      NAME: Sheree Butt  AGE: 74 year old female  YOB: 1950  MRN: [id number]  EVALUATION DATE & TIME: 2025  5:57 AM    PCP: Porsche lAcala    ED PROVIDER: Mony Wooten M.D.      Chief Complaint   Patient presents with    Chest Pain       FINAL IMPRESSION:  1. Chest wall pain    2. Pulmonary nodule    3. Cervical spondylosis        ED COURSE & MEDICAL DECISION MAKIN:15 AM I met with the patient to gather history and to perform my initial exam. I discussed the plan for care while in the Emergency Department.  9:15 AM I reevaluated and updated the patient with results. We discussed the plan for discharge and the patient is agreeable. Reviewed supportive cares, symptomatic treatment, outpatient follow up, and reasons to return to the Emergency Department. Patient to be discharged by ED RN.   ED Course as of 25 1046   Mon 2025   0613 EKG reviewed by myself at 0604 and shows sinus rhythm rate 75, Qtc 439, compared to previous EKG 3/22/19, similar to previous EKG.  I have independently reviewed and interpreted today's EKG, pending Cardiologist read.     0614 Entire family clinic note from 2025 reviewed, history of high cholesterol on pravastatin, GERD on omeprazole, hypertension on lisinopril, diabetes on metformin glipizide and Januvia, daily baby aspirin.   0628 1.  Chest pain, left sternal border.  2.  Bilateral arm heaviness, no trauma or change in activity.  No history of neck surgery.  Differentials considered include dissection, AAA, ACS, atypical chest pain, cervical stenosis, rheumatologic conditions such as polymyalgia rheumatica.  I ordered blood work, EKG, CT neck, CTA chest for evaluation.  I doubt spinal emergency given lack of trauma and no history of back surgery.  Disposition pending workup.   Update: Troponin negative x 2, CTA chest shows no acute aortic syndrome.  CT C-spine shows cervical spondylosis at multiple  "levels.  Elevated glucose, consistent with previous values however.  Normal sed rate and CRP, normal white count.  Discussed CT findings with patient including pulmonary nodule, spondylosis of the C-spine.  Discussed follow-up, treatment, discharged home, follow-up with primary care.  Return precautions discussed.  Medications discussed including precautions while taking.      Pertinent Labs & Imaging studies reviewed. (See chart for details).      Medical Decision Making  I obtained history from patient, I reviewed the EMR as documented in HPI, ED course, and chart review section above and below, Care impacted by chronic conditions/past medical history as documented in HPI, ED course, and chart review section above below, , I independently interpreted Radiological studies as documented in Radiology section below. See radiology report for final interpretation., and I discussed the care with another health care provider: NA    Discharge. I prescribed additional prescription strength medication(s) as charted. See documentation for any additional details.    MIPS (CTPE, Dental pain, Reeder, Sinusitis, Asthma/COPD, Head Trauma): Not Applicable    SEPSIS: None    At the conclusion of the encounter I discussed the results of all of the tests and the disposition. The questions were answered. The patient or family acknowledged understanding and was agreeable with the care plan.      CRITICAL CARE:  N/A    [facility]    Patient information was obtained from: patient.    Use of : N/A.       Sheree Butt is a 74 year old female who presents to this ED via walk in for evaluation of chest pain.    Patient endorses a 1 week history of intermittent left-sternal border chest tightness and bilateral arm \"heaviness\" with intermittent tingling. She reports the chest tightness and arm tingling does not change with movement or exertion. No recent trauma or lower extremity symptoms. Patient states she had a similar tightness in " her chest over a decade ago but is unsure what caused it. She has no history of neck surgeries but notes that her pillows have felt more uncomfortable recently. She denies any personal or family history of cardiac issues but endorses a family history of diabetes. There were no other concerns/complaints at this time.    Per chart review:  3/22/2019 - Patient presented to [facility] ED for evaluation of left-sided chest pain. Exam normal aside from reproducible left chest wall tenderness. EKG unremarkable, troponin negative, all other labs unremarkable. CXR negative. Patient diagnosed with chest wall strain and treated with Toradol, aspirin, Lidoderm and ice packs. Discharged in stable condition.    REVIEW OF SYSTEMS  All other systems negative unless noted in HPI.    PAST MEDICAL HISTORY:  History reviewed. No pertinent past medical history.    PAST SURGICAL HISTORY:  History reviewed. No pertinent surgical history.      CURRENT MEDICATIONS:    Current Facility-Administered Medications   Medication Dose Route Frequency Provider Last Rate Last Admin    Lidocaine (LIDOCARE) 4 % Patch 1 patch  1 patch Transdermal Once Mony Wooten MD   1 patch at 07/21/25 0728     Current Outpatient Medications   Medication Sig Dispense Refill    baclofen (LIORESAL) 10 MG tablet Take 1 tablet (10 mg) by mouth 3 times daily as needed for other (pain). 20 tablet 0    ketorolac (TORADOL) 10 MG tablet Take 1 tablet (10 mg) by mouth every 6 hours as needed for pain. 20 tablet 0    amLODIPine (NORVASC) 10 MG tablet Take 10 mg by mouth daily      aspirin 81 MG EC tablet Take 81 mg by mouth daily      famotidine (PEPCID) 20 MG tablet Take 20 mg by mouth 2 times daily      gabapentin (NEURONTIN) 300 MG capsule Take 1 capsule (300 mg) by mouth 3 times daily as needed for neuropathic pain 90 capsule 3    glipiZIDE (GLUCOTROL XL) 10 MG 24 hr tablet Take 20 mg by mouth daily      lisinopril (ZESTRIL) 40 MG tablet Take 40 mg by mouth daily       metFORMIN (GLUCOPHAGE) 500 MG tablet Take 1,000 mg by mouth 2 times daily (with meals)      omeprazole (PRILOSEC) 20 MG DR capsule Take 20 mg by mouth daily      pravastatin (PRAVACHOL) 10 MG tablet Take 10 mg by mouth daily      sitagliptin (JANUVIA) 25 MG tablet Take 25 mg by mouth daily           ALLERGIES:  No Known Allergies    FAMILY HISTORY:  History reviewed. No pertinent family history.    SOCIAL HISTORY:  Social History     Socioeconomic History    Marital status: Single   Tobacco Use    Smoking status: Never     Passive exposure: Never    Smokeless tobacco: Never       VITALS:  Patient Vitals for the past 24 hrs:   BP Temp Temp src Pulse Resp SpO2 Weight   07/21/25 0850 -- -- -- 72 22 94 % --   07/21/25 0802 -- -- -- 76 17 93 % --   07/21/25 0800 (!) 146/68 -- -- 74 -- 92 % --   07/21/25 0701 (!) 165/81 -- -- 75 22 93 % --   07/21/25 0646 (!) 161/79 -- -- 71 15 93 % --   07/21/25 0630 (!) 162/77 -- -- 71 15 93 % --   07/21/25 0615 (!) 175/80 -- -- 71 14 94 % --   07/21/25 0605 (!) 186/90 97.9  F (36.6  C) Oral 74 20 95 % 92.1 kg (203 lb)   07/21/25 0601 (!) 186/90 -- -- 78 14 96 % --       PHYSICAL EXAM    VITAL SIGNS: BP (!) 146/68   Pulse 72   Temp 97.9  F (36.6  C) (Oral)   Resp 22   Wt 92.1 kg (203 lb)   SpO2 94%   BMI 30.20 kg/m    Physical Exam  Vitals and nursing note reviewed.   Constitutional:       General: She is not in acute distress.     Appearance: She is not toxic-appearing.   Eyes:      General: No scleral icterus.        Right eye: No discharge.         Left eye: No discharge.   Cardiovascular:      Rate and Rhythm: Normal rate and regular rhythm.   Pulmonary:      Effort: Pulmonary effort is normal. No respiratory distress.      Breath sounds: Normal breath sounds.   Abdominal:      General: There is no distension.      Palpations: Abdomen is soft.      Tenderness: There is no abdominal tenderness.   Musculoskeletal:         General: No swelling or deformity.      Cervical back:  Neck supple. No rigidity.   Skin:     General: Skin is warm and dry.      Capillary Refill: Capillary refill takes less than 2 seconds.      Findings: No bruising or erythema.   Neurological:      General: No focal deficit present.      Mental Status: She is alert and oriented to person, place, and time. Mental status is at baseline.      Comments: No slurred speech, following commands spontaneously. No facial droop.   Psychiatric:         Mood and Affect: Mood normal.         Behavior: Behavior normal.         LABS  Labs Ordered and Resulted from Time of ED Arrival to Time of ED Departure   COMPREHENSIVE METABOLIC PANEL - Abnormal       Result Value    Sodium 140      Potassium 3.7      Carbon Dioxide (CO2) 26      Anion Gap 12      Urea Nitrogen 11.3      Creatinine 0.88      GFR Estimate 69      Calcium 9.4      Chloride 102      Glucose 141 (*)     Alkaline Phosphatase 64      AST 16      ALT 20      Protein Total 6.9      Albumin 4.2      Bilirubin Total 0.4     TROPONIN T, HIGH SENSITIVITY - Normal    Troponin T, High Sensitivity 11     TROPONIN T, HIGH SENSITIVITY - Normal    Troponin T, High Sensitivity 9     ERYTHROCYTE SEDIMENTATION RATE AUTO - Normal    Erythrocyte Sedimentation Rate 11     CRP INFLAMMATION - Normal    CRP Inflammation <3.00     CBC WITH PLATELETS AND DIFFERENTIAL    WBC Count 6.8      RBC Count 4.97      Hemoglobin 14.9      Hematocrit 42.7      MCV 86      MCH 30.0      MCHC 34.9      RDW 13.2      Platelet Count 240      % Neutrophils 65      % Lymphocytes 22      % Monocytes 8      % Eosinophils 4      % Basophils 1      % Immature Granulocytes 1      NRBCs per 100 WBC 0      Absolute Neutrophils 4.4      Absolute Lymphocytes 1.5      Absolute Monocytes 0.6      Absolute Eosinophils 0.3      Absolute Basophils 0.1      Absolute Immature Granulocytes 0.0      Absolute NRBCs 0.0           RADIOLOGY  CTA Chest with Contrast   Final Result   IMPRESSION:   1.  No acute findings. No  thoracic aortic aneurysm, dissection or intramural hematoma. No pulmonary artery embolism.   2.   Right lower lobe 7 mm solid pulmonary nodule. Recommend follow-up per the guidelines below.         REFERENCE:   Guidelines for Management of Incidental Pulmonary Nodules Detected on CT Images: From the Fleischner Society 2017.    Guidelines apply to incidental nodules in patients who are 35 years or older.   Guidelines do not apply to lung cancer screening, patients with immunosuppression, or patients with known primary cancer.      SINGLE NODULE      Nodule size 6-8 mm   Low-risk patients: Follow-up CT at 6-12 months, then consider CT at 18-24 months.   High-risk patients: Follow-up CT at 6-12 months, then at 18-24 months if no change.               CT Cervical Spine w/o Contrast   Final Result   IMPRESSION:   1.  No fracture or posttraumatic subluxation.   2.  Multilevel cervical spondylosis without high-grade spinal canal stenosis. Multilevel neural foraminal stenosis as detailed in the body of the report.            I have independently reviewed the above image. See radiology report for detail.      EKG:    See ED course notes above.       PROCEDURES:  N/A      MEDICATIONS GIVEN IN THE EMERGENCY:  Medications   Lidocaine (LIDOCARE) 4 % Patch 1 patch (1 patch Transdermal $Patch/Med Applied 7/21/25 1993)   iopamidol (ISOVUE-370) solution 90 mL (90 mLs Intravenous $Given 7/21/25 8977)       NEW PRESCRIPTIONS STARTED AT TODAY'S ER VISIT  Discharge Medication List as of 7/21/2025  9:20 AM        START taking these medications    Details   baclofen (LIORESAL) 10 MG tablet Take 1 tablet (10 mg) by mouth 3 times daily as needed for other (pain)., Disp-20 tablet, R-0, E-Prescribe      ketorolac (TORADOL) 10 MG tablet Take 1 tablet (10 mg) by mouth every 6 hours as needed for pain., Disp-20 tablet, R-0, E-Prescribe              Bernard TSAI am serving as a scribe to document services personally performed by Mony Wooten  MD, based on my observations and the provider's statements to me.  I, Mony Wooten MD, attest that Bernard Chou is acting in a scribe capacity, has observed my performance of the services and has documented them in accordance with my direction.     Mony Wooten MD  Emergency Medicine  [facility] EMERGENCY DEPARTMENT  [address] [place] [id number]  [phone]  Dept: [phone]             Mony Wooten MD  07/21/25 1046

## 2025-07-21 NOTE — DISCHARGE INSTRUCTIONS
You have CT findings of multilevel cervical spondylosis without high-grade spinal canal stenosis.  There are no signs or symptoms of heart attack or dangerous cause for your chest pain.  You have a pulmonary nodule that should be re-imaged per your PCP (in 6-12months).  Take pain medication as needed to help your symptoms resolve.  You can speak with your primary care doctor regarding your neck pain/upper extremity weakness. There is no specific therapy recommended at this point from the ER standpoint.